# Patient Record
(demographics unavailable — no encounter records)

---

## 2024-11-13 NOTE — DISCUSSION/SUMMARY
[EKG obtained to assist in diagnosis and management of assessed problem(s)] : EKG obtained to assist in diagnosis and management of assessed problem(s) [FreeTextEntry1] : 62 year old obese woman with severe, hyperlipidemia c/b statin intolerance, moderate aortic stenosis, HTN, and peripheral vascular disease presenting for follow-up.   # Hyperlipidemia --  statin intolerance x 2; taking ezetimibe 10 mg daily   - Praluent prescribed   - Continue  ezetimibe 10 mg daily   - Continue CoQ10 and fish oils, etc.   - Obtain lipid panel now along with other labs.   # Deconditioning # Weight plateau  # Mild pHTN  - Plan to lose 5 lbs over next 3 months by removing sweets and switching to sparkling water.   - 15-20 min walking daily outside.   - CMP, A1c   # Moderate aortic stenosis; since last visit in Jan 2024, development of NYHA class II heart failure symptoms. No chest pain or syncope.   - Needs TTE  - proBNP  # HTN; will try to switch to an amlodipine + valsartan +/- spironolactone regimen at next visit.   - Continue losartan 100  / HCTZ 12.5 daily   - Continue spironolactone 25 mg daily   RTC: 1-2 months, with studies complete.  Appreciate the opportunity to participate in the care of Ms. MILIAN. Strict ER precautions were provided to patient should symptoms worsen, or new ones present. Please do not hesitate to reach out with any questions, concerns, or changes in clinical status.   Jerry Gale MD Interventional Cardiology

## 2024-11-13 NOTE — HISTORY OF PRESENT ILLNESS
[FreeTextEntry1] : Mrs. Matute is a 62 year old smoker with hyperlipidemia, HTN, obesity, generalized deconditioning, venous insufficiency (significantly improved), poorly-controlled hyperlipidemia c/b statin intolerance on rosuvastatin 10 mg daily and moderate aortic stenosis, who presents for follow-up.   On 01/2024, patient was switched to atorvastatin 40 mg daily w/ ezetimibe 10 mg daily + CoQ10.   Today, on 11/12/24: Patient only on ezetimibe due to second statin intolerance. Notes decline in exertional capacity. Was unable to follow-up as intended due to her mother's passing. Patient cutting back significantly on smoking. Jreemias León's book, "The Easy Way" recommended. Need to prioritize labs / TTE to check AS severity given development of NYHA class II heart failure symptoms. No chest pain or syncope.   CARDIOVASCULAR STUDIES:  11/13/24 ECG: NSR, normal ECG. (ECGs physically printed and signed at [place]) 01/25/24 ECG: NSR, normal ECG.  01/25/24 TTE: LVEF 65-70%. Normal LV/RV size/function.. Mild TR/MS/ME. Mod AS. Vmax 2.8 m/s, MG 12.4 mmHg, DI 0.37. JEFFREY estimated at 1.2 cm2 by continuity. No AR.  12/04/23 LAB: , TChol 266, Trig 145   CARDIOVASCULAR EXAM:  Vital Signs Reviewed (see below)  Gen: NAD  Cardiac: RRR, nl s1, s2. Normal PMI. 3/6 systolic murmur at RUSB, rubs, or gallops. Lungs:  Good air flow. No wheezing or stridor. Ext: Warm. 2+ radial pulses bilaterally. Mild bilateral lower extremity edema.

## 2024-11-13 NOTE — REASON FOR VISIT
[Structural Heart and Valve Disease] : structural heart and valve disease [Hyperlipidemia] : hyperlipidemia [Hypertension] : hypertension [FreeTextEntry3] : Francine Sepulveda (PCP)

## 2025-01-08 NOTE — HISTORY OF PRESENT ILLNESS
[FreeTextEntry1] : Mrs. Matute is a 62 year old smoker with hyperlipidemia, HTN, obesity, generalized deconditioning, venous insufficiency (significantly improved), poorly-controlled hyperlipidemia c/b statin intolerance on rosuvastatin 10 mg daily and moderate aortic stenosis, who presents for follow-up.   On 01/2024, patient was switched to atorvastatin 40 mg daily w/ ezetimibe 10 mg daily + CoQ10.   On 11/12/24: Patient only on ezetimibe due to second statin intolerance. Notes decline in exertional capacity. Was unable to follow-up as intended due to her mother's passing. Patient cutting back significantly on smoking. Jeremias León's book, "The Easy Way" recommended. Need to prioritize labs / TTE to check AS severity given development of NYHA class II heart failure symptoms. No chest pain or syncope.   On 01/08/25 - discussed moderate AS and supplements. NYHA class I symptoms. Neuropathy sx -- counseled on positioning while driving.   CARDIOVASCULAR STUDIES:  12/26/24 TTE: LVEF 60-65%. Trileaflet aortic valve with reduced systolic excursion. There is moderate calcification of the aortic valve leaflets. Moderate aortic stenosis. 5. The peak transaortic velocity is 2.74 m/s, peak transaortic gradient is 30.0 mmHg and mean transaortic gradient is 16.7 mmHg with an LVOT/aortic valve VTI ratio of 0.38. The effective orifice area is estimated at 1.28 cm by the continuity equation. 6. No mitral valve stenosis. 11/13/24 ECG: NSR, normal ECG. (ECGs physically printed and signed at [place]) 01/25/24 ECG: NSR, normal ECG.  01/25/24 TTE: LVEF 65-70%. Normal LV/RV size/function.. Mild TR/MS/CA. Mod AS. Vmax 2.8 m/s, MG 12.4 mmHg, DI 0.37. JEFFREY estimated at 1.2 cm2 by continuity. No AR.  12/04/23 LAB: , TChol 266, Trig 145   CARDIOVASCULAR EXAM:  Vital Signs Reviewed (see below)  Gen: NAD  Cardiac: RRR, nl s1, s2. Normal PMI. 3/6 systolic murmur at RUSB, rubs, or gallops. Lungs:  Good air flow. No wheezing or stridor. Ext: Warm. 2+ radial pulses bilaterally. Mild bilateral lower extremity edema.

## 2025-01-08 NOTE — DISCUSSION/SUMMARY
[FreeTextEntry1] : 62 year old obese woman with severe, hyperlipidemia c/b statin intolerance, moderate aortic stenosis, HTN, and peripheral vascular disease presenting for follow-up.   # Hyperlipidemia --  statin intolerance x 2  - Praluent prescribed   - Continue  ezetimibe 10 mg daily   - Continue CoQ10 and fish oils, etc.    # Deconditioning # Weight plateau  # Mild pHTN  - Plan to lose 5 lbs over next 3 months by removing sweets and switching to sparkling water.   - 15-20 min walking daily outside.   - CMP, A1c   # Moderate aortic stenosis; No chest pain or syncope or heart failure.   - Annual TTE.   # HTN;   - Continue losartan 100  / HCTZ 12.5 daily   - Continue spironolactone 25 mg daily  - Consider switching to valsartan if more antihypertensive control is needed.   RTC: 3 months   Appreciate the opportunity to participate in the care of Ms. MILIAN. Strict ER precautions were provided to patient should symptoms worsen, or new ones present. Please do not hesitate to reach out with any questions, concerns, or changes in clinical status.   Jerry Gale MD Interventional Cardiology

## 2025-01-08 NOTE — HISTORY OF PRESENT ILLNESS
[FreeTextEntry1] : Mrs. Matute is a 62 year old smoker with hyperlipidemia, HTN, obesity, generalized deconditioning, venous insufficiency (significantly improved), poorly-controlled hyperlipidemia c/b statin intolerance on rosuvastatin 10 mg daily and moderate aortic stenosis, who presents for follow-up.   On 01/2024, patient was switched to atorvastatin 40 mg daily w/ ezetimibe 10 mg daily + CoQ10.   On 11/12/24: Patient only on ezetimibe due to second statin intolerance. Notes decline in exertional capacity. Was unable to follow-up as intended due to her mother's passing. Patient cutting back significantly on smoking. Jeremias León's book, "The Easy Way" recommended. Need to prioritize labs / TTE to check AS severity given development of NYHA class II heart failure symptoms. No chest pain or syncope.   On 01/08/25 - discussed moderate AS and supplements. NYHA class I symptoms. Neuropathy sx -- counseled on positioning while driving.   CARDIOVASCULAR STUDIES:  12/26/24 TTE: LVEF 60-65%. Trileaflet aortic valve with reduced systolic excursion. There is moderate calcification of the aortic valve leaflets. Moderate aortic stenosis. 5. The peak transaortic velocity is 2.74 m/s, peak transaortic gradient is 30.0 mmHg and mean transaortic gradient is 16.7 mmHg with an LVOT/aortic valve VTI ratio of 0.38. The effective orifice area is estimated at 1.28 cm by the continuity equation. 6. No mitral valve stenosis. 11/13/24 ECG: NSR, normal ECG. (ECGs physically printed and signed at [place]) 01/25/24 ECG: NSR, normal ECG.  01/25/24 TTE: LVEF 65-70%. Normal LV/RV size/function.. Mild TR/MS/[place]. Mod AS. Vmax 2.8 m/s, MG 12.4 mmHg, DI 0.37. JEFFREY estimated at 1.2 cm2 by continuity. No AR.  12/04/23 LAB: , TChol 266, Trig 145   CARDIOVASCULAR EXAM:  Vital Signs Reviewed (see below)  Gen: NAD  Cardiac: RRR, nl s1, s2. Normal PMI. 3/6 systolic murmur at RUSB, rubs, or gallops. Lungs:  Good air flow. No wheezing or stridor. Ext: Warm. 2+ radial pulses bilaterally. Mild bilateral lower extremity edema.

## 2025-02-05 NOTE — DISCUSSION/SUMMARY
[FreeTextEntry1] : 62 year old obese woman with severe, hyperlipidemia c/b statin intolerance, moderate aortic stenosis, HTN, and peripheral vascular disease presenting for follow-up after recent COPD exacerbation hospitalization. Feeling better but still symptomatic and had an atypical hospital presentation for COPD -- concern for underestimated AS. Needs R/LHC   # Hyperlipidemia --  statin intolerance x 2  - Praluent prescribed   - Continue  ezetimibe 10 mg daily   - Continue CoQ10 and fish oils, etc.    # Deconditioning # Weight plateau  # Mild pHTN  - Plan to lose 5 lbs over next 3 months by removing sweets and switching to sparkling water.   - 15-20 min walking daily outside.   - CMP, A1c   # Moderate aortic stenosis; No chest pain or syncope or heart failure.   - Annual TTE.   # HTN;   - Continue losartan 100  / HCTZ 12.5 daily   - Continue spironolactone 25 mg daily  - Consider switching to valsartan if more antihypertensive control is needed.   RTC: 1 month   Appreciate the opportunity to participate in the care of Ms. MILIAN. Strict ER precautions were provided to patient should symptoms worsen, or new ones present. Please do not hesitate to reach out with any questions, concerns, or changes in clinical status.   Jerry Gale MD Interventional Cardiology

## 2025-02-05 NOTE — HISTORY OF PRESENT ILLNESS
[FreeTextEntry1] : Mrs. Matute is a 62 year old smoker with hyperlipidemia, HTN, obesity, generalized deconditioning, venous insufficiency (significantly improved), poorly-controlled hyperlipidemia c/b statin intolerance on rosuvastatin 10 mg daily and moderate aortic stenosis, who presents for follow-up.   On 01/2024, patient was switched to atorvastatin 40 mg daily w/ ezetimibe 10 mg daily + CoQ10.   On 11/12/24: Patient only on ezetimibe due to second statin intolerance. Notes decline in exertional capacity. Was unable to follow-up as intended due to her mother's passing. Patient cutting back significantly on smoking. Jeremias León's book, "The Easy Way" recommended. Need to prioritize labs / TTE to check AS severity given development of NYHA class II heart failure symptoms. No chest pain or syncope.   On 01/08/25 - discussed moderate AS and supplements. NYHA class I symptoms. Neuropathy sx -- counseled on positioning while driving.   02/05/2025 - Recent hospitalization for COPD exacerbation. Feeling better. Still has clinical evidence of NYHA class II-III heart failure concerning for symptomatic aortic stenosis. Needs RL which we will order. No chest pain, dyspnea at rest, palpitations.   CARDIOVASCULAR STUDIES:  12/26/24 TTE: LVEF 60-65%. Trileaflet aortic valve with reduced systolic excursion. There is moderate calcification of the aortic valve leaflets. Moderate aortic stenosis. 5. The peak transaortic velocity is 2.74 m/s, peak transaortic gradient is 30.0 mmHg and mean transaortic gradient is 16.7 mmHg with an LVOT/aortic valve VTI ratio of 0.38. The effective orifice area is estimated at 1.28 cm by the continuity equation. 6. No mitral valve stenosis. 11/13/24 ECG: NSR, normal ECG. (ECGs physically printed and signed at [place]) 01/25/24 ECG: NSR, normal ECG.  01/25/24 TTE: LVEF 65-70%. Normal LV/RV size/function.. Mild TR/MS/TN. Mod AS. Vmax 2.8 m/s, MG 12.4 mmHg, DI 0.37. JEFFREY estimated at 1.2 cm2 by continuity. No AR.  12/04/23 LAB: , TChol 266, Trig 145   CARDIOVASCULAR EXAM:  Vital Signs Reviewed (see below)  Gen: NAD  Cardiac: RRR, nl s1, s2. Normal PMI. 3/6 systolic murmur at RUSB, rubs, or gallops. Lungs:  Good air flow. No wheezing or stridor. Ext: Warm. 2+ radial pulses bilaterally. Mild bilateral lower extremity edema.

## 2025-02-26 NOTE — HISTORY OF PRESENT ILLNESS
[FreeTextEntry1] : Mrs. Matute is a 62 year old smoker with hyperlipidemia, HTN, obesity, generalized deconditioning, venous insufficiency (significantly improved), poorly-controlled hyperlipidemia c/b statin intolerance on rosuvastatin 10 mg daily and moderate aortic stenosis, who presents for follow-up.   On 01/2024, patient was switched to atorvastatin 40 mg daily w/ ezetimibe 10 mg daily + CoQ10.   On 11/12/24: Patient only on ezetimibe due to second statin intolerance. Notes decline in exertional capacity. Was unable to follow-up as intended due to her mother's passing. Patient cutting back significantly on smoking. Jeremias León's book, "The Easy Way" recommended. Need to prioritize labs / TTE to check AS severity given development of NYHA class II heart failure symptoms. No chest pain or syncope.   On 01/08/25 - discussed moderate AS and supplements. NYHA class I symptoms. Neuropathy sx -- counseled on positioning while driving.   02/05/2025 - Recent hospitalization for COPD exacerbation. Feeling better. Still has clinical evidence of NYHA class II-III heart failure concerning for symptomatic aortic stenosis. Needs RLHC which we will order. No chest pain, dyspnea at rest, palpitations.   02/26/25 - s/p LHC w/ simultaneous Ao/LV pressure assessment reflecting milder AS than expected. Coronaries are widely patent. RRA / RBV access sites unremarkable. No discomfort or swelling. Mild erythema from tegederm at RBV site, improving. No chest pain, shortness of breath, palpitations, lower extremity swelling, orthopnea, PND. Patient has been otherwise doing well and maintaining good exercise capacity without any noticeable decline. A ten-point review of systems was performed and found to be negative, except per HPI. A1c high. Lipids optimal. LVEDP 22 mmHg on invasive assessment.   CARDIOVASCULAR STUDIES:  02/26/25 LAB: Lymphocytes 4.20 (review with PCP) , Chol 124, Trig 151, Hdl 47 , LDL 51 , A1c 6.6 Cr 0.83, LFT wnl.  02/26/25 LHC: Mild AS on simultaneous pressure assessment. Mild diffuse luminal CAD.  12/26/24 TTE: LVEF 60-65%. Trileaflet aortic valve with reduced systolic excursion. There is moderate calcification of the aortic valve leaflets. Moderate aortic stenosis. 5. The peak transaortic velocity is 2.74 m/s, peak transaortic gradient is 30.0 mmHg and mean transaortic gradient is 16.7 mmHg with an LVOT/aortic valve VTI ratio of 0.38. The effective orifice area is estimated at 1.28 cm by the continuity equation. 6. No mitral valve stenosis. 11/13/24 ECG: NSR, normal ECG. (ECGs physically printed and signed at [place]) 01/25/24 ECG: NSR, normal ECG.  01/25/24 TTE: LVEF 65-70%. Normal LV/RV size/function.. Mild TR/MS/WI. Mod AS. Vmax 2.8 m/s, MG 12.4 mmHg, DI 0.37. JEFFREY estimated at 1.2 cm2 by continuity. No AR.  12/04/23 LAB: , TChol 266, Trig 145   CARDIOVASCULAR EXAM:  Gen: NAD Cardiac: RRR, nl s1, s2. No MRG Lungs: Good airflow, no wheezing. Good respiratory effort. Ext: Warm   Right radial artery and right brachial vein access sites demonstrates no swelling, tenderness, or discoloration. 2+ distal pulses at all distal extremities.

## 2025-02-26 NOTE — DISCUSSION/SUMMARY
[FreeTextEntry1] : 62 year old obese woman with severe, hyperlipidemia c/b statin intolerance, aortic stenosis overestimated by TTE / mild severity on LHC, elevated LVEDP consistent with HFpEF / diastolic dysfunction, borderline DM II (A1c 6.6),  HTN, and peripheral vascular disease presenting for follow-up after LRHC w/ invasive pressure assessment for AS. Feeling well. Need to escalate DM II and HFpEF mgmt. Significant counseling for smoking cessation with resources / pharmacologic and behavioral -- provided. She will consider.  # Hyperlipidemia --  statin intolerance x 2  - Praluent prescribed   - Continue  ezetimibe 10 mg daily   - Continue CoQ10 and fish oils, etc.    # Deconditioning # Weight plateau  # Mild pHTN  - Plan to lose 5 lbs over next 3 months by removing sweets and switching to sparkling water.   - 15-20 min walking daily outside.   # Mild aortic stenosis overestimated by TTE  # Elevated LVEDP c/w HFpEF (NYHA class II symptoms) # DM II (A1c 6.6)   - Stop spironolactone, start SGLT2i (farxiga 5 mg daily)     - If too expensive, can try jardiance 10 mg daily, +/- financial assistance/prior auth. Asked her to call us.  - Repeat CMP, A1c, lipid panel, BNP prior to 3 mo RPA .  # HTN;   - Continue losartan 100  / HCTZ 12.5 daily   - Consider switching to valsartan if more antihypertensive control is needed.   RTC: 3 month   Appreciate the opportunity to participate in the care of Ms. MILIAN. Strict ER precautions were provided to patient should symptoms worsen, or new ones present. Please do not hesitate to reach out with any questions, concerns, or changes in clinical status.   Jerry Gale MD, FACC